# Patient Record
Sex: FEMALE | Race: BLACK OR AFRICAN AMERICAN | NOT HISPANIC OR LATINO | Employment: UNEMPLOYED | URBAN - METROPOLITAN AREA
[De-identification: names, ages, dates, MRNs, and addresses within clinical notes are randomized per-mention and may not be internally consistent; named-entity substitution may affect disease eponyms.]

---

## 2017-06-23 DIAGNOSIS — G82.20 PARAPLEGIA: Primary | ICD-10-CM

## 2017-07-13 ENCOUNTER — CLINICAL SUPPORT (OUTPATIENT)
Dept: REHABILITATION | Facility: HOSPITAL | Age: 27
End: 2017-07-13
Attending: NURSE PRACTITIONER
Payer: MEDICAID

## 2017-07-13 DIAGNOSIS — G82.20 PARAPLEGIA: ICD-10-CM

## 2017-07-13 PROCEDURE — 97165 OT EVAL LOW COMPLEX 30 MIN: CPT | Mod: PO

## 2017-07-13 NOTE — PROGRESS NOTES
Elisha Cooper  [id number]  [place]    Diagnosis: paraplegia T4     Onset date: 10 years  Date of service: 7/13/17    Orders: wheelchair eval    Subjective:  Patient goals: Safe and efficient use of a wheelchair. Prevention of skin breakdown.        Past Medical History:   Diagnosis Date    Anxiety     Chronic low back pain     Paraplegia        Review of patient's allergies indicates:  No Known Allergies    Precautions: fall  Social Hx: apartment which is accessible , has a caregiver    DME: lightweight w/c in the past, no bath equipment. She is currently sitting in a standard w/c with no cushion.   Home access: no steps    Past treatment includes: none recently     Pain: 0/10 at rest, 8/10 with over activity, she relieves pain with rest.     Dominant hand: R    Occupation/hobbies/homemaking: baking and cooking    Driving status: not active  Objective  Cognitive Exam  Oriented: WNL  Visual/perceptual: WNL    Physical Exam:  AROM/PROM,Strength and Coordination:    UE BUE WNL for strength and ROM     LE Paraplegic, no activity in BLE, PROM WNL.   Tone: spasms in legs  Sensation: no sensation below level of injury.     Posture: Pt. Sits with a kyphosis, she has an s curve from level of injury.   Trunk: no lumbar lordosis, weak trunk, she can obtain upright but not maintain.   Head WNL    Skin integrity:  WNL      Pt. Is in wheelchair 5 hours a day. Elisha Cooper is able to do pressure reliefs. It is expected that she will be in chair longer if good back support to relieve back pain.   Balance:   Static Sit: Good in flexed posture  Dynamic sit: Fair  Static Stand:n/a  Dynamic stand: n/a    Twan Michelle from [employer] performed seating measures in my presence.  Functional Status:    Functional Mobility:  Bed mobility: Mod I  Roll to left:Mod I  Roll to right: Mod I  Supine to sit: Mod I  Sit to supine: Mod I  Transfers to bed: Mod I   Transfers to toilet: Mod I   Car transfers: Mod I  Wheelchair mobility: Mod I  Pt.  Unable to fit current w/c through doors of bathroom so family has to carry her to bath and toilet. They are currently looking for a new apartment.     ADL's:  Feeding: I  Grooming: I  Hygiene:I  UB Dressing: I  LB Dressing: I  Toileting:  Self catheter  Bathing: I once placed in bottom of tub.     IADL's:  Homecare: Mod I  Cooking: Mod I  Laundry: Mod I  Yard work: n/a  Use of telephone:I  Money management:I  Medication management: I    TODAY'S TREATMENT  Evaluate for lightweight wheelchair    ASSESSMENT:  OT diagnosis: T4 paraplegic, impaired mobility  Assessment  Elisha Cooper is a 27 y.o. with a medical diagnosis of T4 paraplegic   referred to occupational therapy for wheelchair postioning.     Patient has mobility limitation that significantly impairs safe, timely, consistent participation in one or more MRADL. Yes   A mobility assistive device will effectively improve ability to participate in or aid participation in MRADL's.  Yes   A cane or walker will provide patient the ability to safely and consistently perform MRADLs in a timely manner  no  The patient's home environment will support use of recommended mobility device. Yes   The patient has sufficient UE strength to effectively self propel a manual wheelchair for al MRADL's. Yes   The patient has sufficient upper extremity strength, , transfer ability and trunk stability to safely operate a POV(scooter). no  The additional benefits of a power wheelchair will more effectively enable MRADL's in home.  no  The patient demonstrates ability/potential ability to use recommended equipment. Yes   The patient is willing and motivated to use the recommended equipment. Yes     A Quickie 5R with the following parts of   Jay3 back for support of lumbar back due to weakness at level of injury and back pain and to support S curve.  Full poly tires 24 inches with mags as her hands get caught in spokes with prior chair  5 inch casters   Leg strap due to spasms with  activity  Tube arm rest  Rear antitippers  stimulite slimline contour cushion due to back pain and [place] of [place]  Scissor wheellocks  Push handles     Is recommended to meet the following goals:  Improve mobility  Improve postural alignment  Decrease chance of injury;Improve safety  Improved endurance as a result of upright and midline positioning.  Provide trunk support.  Facilitate I in self care/home care.  Provide I in home.  Decrease chance of pressure wounds.  Control abnormal movements  Accessories needed to extend life expectancy of this product  PLAN:   Patient/caregiver understands and agrees with plan of care.  OT to work with vendor, ENRICO Mendoza from [employer] to obtain recommended wheelchair.   Lali Gill, OT

## 2021-12-27 ENCOUNTER — HOSPITAL ENCOUNTER (EMERGENCY)
Facility: HOSPITAL | Age: 31
Discharge: HOME | End: 2021-12-27
Attending: EMERGENCY MEDICINE
Payer: MEDICAID

## 2021-12-27 VITALS
TEMPERATURE: 98 F | DIASTOLIC BLOOD PRESSURE: 60 MMHG | RESPIRATION RATE: 16 BRPM | HEART RATE: 62 BPM | SYSTOLIC BLOOD PRESSURE: 98 MMHG | OXYGEN SATURATION: 98 %

## 2021-12-27 DIAGNOSIS — K04.7 DENTAL ABSCESS: Primary | ICD-10-CM

## 2021-12-27 PROCEDURE — 25000003 PHARM REV CODE 250: Performed by: EMERGENCY MEDICINE

## 2021-12-27 PROCEDURE — 99283 EMERGENCY DEPT VISIT LOW MDM: CPT | Mod: 25

## 2021-12-27 PROCEDURE — 41009 DRAINAGE OF MOUTH LESION: CPT

## 2021-12-27 RX ORDER — AMOXICILLIN 500 MG/1
500 CAPSULE ORAL 3 TIMES DAILY
Qty: 21 CAPSULE | Refills: 0 | Status: SHIPPED | OUTPATIENT
Start: 2021-12-27 | End: 2022-01-03

## 2021-12-27 RX ORDER — LIDOCAINE HYDROCHLORIDE AND EPINEPHRINE 10; 10 MG/ML; UG/ML
15 INJECTION, SOLUTION INFILTRATION; PERINEURAL ONCE
Status: COMPLETED | OUTPATIENT
Start: 2021-12-27 | End: 2021-12-27

## 2021-12-27 RX ADMIN — LIDOCAINE HYDROCHLORIDE AND EPINEPHRINE 15 ML: 10; 10 INJECTION, SOLUTION INFILTRATION; PERINEURAL at 05:12

## 2021-12-27 NOTE — DISCHARGE INSTRUCTIONS
,    Thank you for letting me care for you today! It was nice meeting you, and I hope you feel better soon.   If you would like access to your chart and what was done today please utilize the [facility].   Please come back to [facility] for all of your future medical needs.    Our goal in the emergency department is to always give you outstanding care and exceptional service. You may receive a survey by mail or e-mail in the next week regarding your experience in our ED. We would greatly appreciate you completing and returning the survey. Your feedback provides us with a way to recognize our staff who give very good care and it helps us learn how to improve when your experience was below our aspiration of excellence.     Sincerely,    Chepe León MD  Board Certified Emergency Physician

## 2021-12-27 NOTE — ED PROVIDER NOTES
Encounter Date: 12/27/2021       History     Chief Complaint   Patient presents with    Dental Pain     Right bottom gum line pain x 2 days      This is a 31-year-old female with a past medical history significant for wheelchair dependency who presents for evaluation of a dental abscess.  She notes that the pain and swelling in the job began over the last day.  She has had something like this in the past was treated for dental abscess at that time.  She has been unable to follow up with a dentist since that time.  She denies any fevers chills difficulty breathing or pain in the throat.  She has not take anything specifically try and help with this, nothing in particular seems to make it any better it has been getting worse with time.        Review of patient's allergies indicates:  No Known Allergies  Past Medical History:   Diagnosis Date    Anxiety     Chronic low back pain     Paraplegia      Past Surgical History:   Procedure Laterality Date    BACK SURGERY       No family history on file.  Social History     Tobacco Use    Smoking status: Current Some Day Smoker   Substance Use Topics    Alcohol use: Yes     Review of Systems  Constitutional-no fever  HEENT-positive jaw swelling  Eyes-no redness  Respiratory-no shortness of breath  Cardio-no chest pain  GI-no abdominal pain  Endocrine-no cold intolerance  -no difficulty urinating  MSK-no myalgias  Skin-no rashes  Allergy-no environmental allergy  Neurologic-, no headache  Hematology-no swollen nodes  Behavioral-no confusion  Physical Exam     Initial Vitals   BP Pulse Resp Temp SpO2   12/27/21 1635 12/27/21 1635 12/27/21 1635 12/27/21 1636 12/27/21 1635   (!) 85/46 66 15 98.1 °F (36.7 °C) 99 %      MAP       --                Physical Exam  Constitutional: uncomfortablel appearing, no distress.  Eyes: Conjunctivae normal.  ENT       Head: Normocephalic, atraumatic.       Nose: Normal external appearance        Mouth/Throat: obvious swelling and  fluctuence in the right  space   Hematological/Lymphatic/Immunilogical: no visible lymphadenopathy   Cardiovascular: Normal rate,   Respiratory: Normal respiratory effort.   Gastrointestinal: non distended   Musculoskeletal: Normal range of motion in all extremities. No obvious deformities or swelling.  Neurologic: Alert, oriented. Normal speech and language. No gross focal neurologic deficits are appreciated.  Skin: Skin is warm, dry. No rash noted.  Psychiatric: Mood and affect are normal.   ED Course   I & D - Incision and Drainage    Date/Time: 12/27/2021 5:37 PM  Location procedure was performed: [facility] EMERGENCY DEPARTMENT  Performed by: Chepe León MD  Authorized by: Chepe León MD   Consent Done: Yes  Consent: Verbal consent obtained. Written consent not obtained.  Risks and benefits: risks, benefits and alternatives were discussed  Consent given by: patient  Patient identity confirmed: name  Type: abscess  Body area: mouth  Location details:  space  Anesthesia: local infiltration    Anesthesia:  Local Anesthetic: lidocaine 1% with epinephrine  Anesthetic total: 5 mL    Patient sedated: no  Risk factor: underlying major nerve  Scalpel size: 11  Incision type: single straight  Complexity: simple  Drainage: pus,  bloody and  purulent  Drainage amount: moderate  Wound treatment: incision,  wound left open and  expression of material  Complications: No  Estimated blood loss (mL): 5  Specimens: No  Implants: No        Labs Reviewed - No data to display       Imaging Results    None          Medications   LIDOcaine-EPINEPHrine 1%-1:100,000 injection 15 mL (15 mLs Intradermal Given 12/27/21 6725)     Medical Decision Making:   History:   Old Medical Records: I decided to obtain old medical records.  Old Records Summarized: records from clinic visits.  Differential Diagnosis:   Dental abscess, Ivan's angina, pulpitis  ED Management:  This woman has a dental abscess, it was  drained at the bedside, will be given antibiotics for treatment in addition to drainage and encouraged to follow up with dentist.                      Clinical Impression:   Final diagnoses:  [K04.7] Dental abscess (Primary)          ED Disposition Condition    Discharge Stable        ED Prescriptions     Medication Sig Dispense Start Date End Date Auth. Provider    amoxicillin (AMOXIL) 500 MG capsule Take 1 capsule (500 mg total) by mouth 3 (three) times daily. for 7 days 21 capsule 12/27/2021 1/3/2022 Chepe León MD        Follow-up Information     Follow up With Specialties Details Why Contact Info    DENTIST ASAP               Chepe León MD  12/29/21 2726

## 2021-12-27 NOTE — ED TRIAGE NOTES
Right lower dental pain x 2 days that developed into an abscess - states began to drain just PTA. No fever reported.

## 2021-12-27 NOTE — ED NOTES
Introduced myself to patient. Patient identifiers verified and are correct for this patient. Bed locked and in low position, call light in reach.    PATIENT: Awake, alert, oriented x 3 and appears in no distress.   SKIN: Warm, dry. Color consistent with ethnicity. Mucus membranes pink and moist. Skin is intact with no bruising, swelling, or breakdown noted.  RESP: Respirations unlabored. No cough/cold symptoms reported. No distress noted.